# Patient Record
Sex: FEMALE | Race: WHITE | NOT HISPANIC OR LATINO | URBAN - METROPOLITAN AREA
[De-identification: names, ages, dates, MRNs, and addresses within clinical notes are randomized per-mention and may not be internally consistent; named-entity substitution may affect disease eponyms.]

---

## 2018-07-01 ENCOUNTER — EMERGENCY (EMERGENCY)
Facility: HOSPITAL | Age: 43
LOS: 1 days | End: 2018-07-01
Payer: COMMERCIAL

## 2018-07-01 PROCEDURE — 99283 EMERGENCY DEPT VISIT LOW MDM: CPT | Mod: 25

## 2022-03-15 ENCOUNTER — EMERGENCY (EMERGENCY)
Facility: HOSPITAL | Age: 47
LOS: 1 days | Discharge: HOME | End: 2022-03-15
Admitting: EMERGENCY MEDICINE
Payer: COMMERCIAL

## 2022-03-15 PROCEDURE — 99284 EMERGENCY DEPT VISIT MOD MDM: CPT
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
## 2022-03-17 DIAGNOSIS — S33.5XXA SPRAIN OF LIGAMENTS OF LUMBAR SPINE, INITIAL ENCOUNTER: ICD-10-CM
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
## 2022-03-17 DIAGNOSIS — M54.50 LOW BACK PAIN, UNSPECIFIED: ICD-10-CM
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
## 2022-03-17 DIAGNOSIS — S39.012A STRAIN OF MUSCLE, FASCIA AND TENDON OF LOWER BACK, INITIAL ENCOUNTER: ICD-10-CM
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
## 2022-03-18 ENCOUNTER — APPOINTMENT (OUTPATIENT)
Dept: NEUROSURGERY | Facility: CLINIC | Age: 47
End: 2022-03-18
Payer: COMMERCIAL

## 2022-03-18 VITALS
HEIGHT: 60 IN | WEIGHT: 115 LBS | HEART RATE: 86 BPM | TEMPERATURE: 97.7 F | BODY MASS INDEX: 22.58 KG/M2 | DIASTOLIC BLOOD PRESSURE: 86 MMHG | SYSTOLIC BLOOD PRESSURE: 129 MMHG

## 2022-03-18 PROBLEM — Z00.00 ENCOUNTER FOR PREVENTIVE HEALTH EXAMINATION: Status: ACTIVE | Noted: 2022-03-18

## 2022-03-18 PROCEDURE — 99203 OFFICE O/P NEW LOW 30 MIN: CPT

## 2022-03-18 RX ORDER — FLUOXETINE HCL 10 MG
TABLET ORAL
Refills: 0 | Status: ACTIVE | COMMUNITY

## 2022-03-18 NOTE — REASON FOR VISIT
[New Patient Visit] : a new patient visit [Referred By: _________] : Patient was referred by ISELA [FreeTextEntry1] : BACK PAIN- [facility] ER FOLLOW UP

## 2022-03-18 NOTE — HISTORY OF PRESENT ILLNESS
[FreeTextEntry1] : low back pain [de-identified] : 47 year old female with 1 week\par Low back pain.  She states that she felt that injury when she was carrying objects.  She denies any leg pain denies any numbness or weakness of bladder bowel function.  States pain is moderate.  Is somewhat improved since her recent visit at [facility].  The pain is constant.  She denies any relieving or aggravating factors denies any bladder incontinence.  She has been taking several medications provided by hospital.  She has not had physical therapy pain management chiropractic care

## 2022-03-18 NOTE — ASSESSMENT
[FreeTextEntry1] : 47-year-old female with acute low back pain without radicular symptoms.  At this time will initiate conservative management which includes anti-inflammatories.  I will prescribe her Celebrex.  I will have her start a course of physical therapy.  She is unimproved in the next 4 to 6 weeks we will obtain an MRI of the lumbar spine.

## 2022-03-21 RX ORDER — CELECOXIB 200 MG/1
200 CAPSULE ORAL DAILY
Qty: 30 | Refills: 2 | Status: ACTIVE | COMMUNITY
Start: 2022-03-18 | End: 1900-01-01

## 2022-03-24 RX ORDER — MELOXICAM 15 MG/1
15 TABLET ORAL
Qty: 30 | Refills: 3 | Status: ACTIVE | COMMUNITY
Start: 2022-03-24 | End: 1900-01-01

## 2024-03-25 ENCOUNTER — OFFICE (OUTPATIENT)
Dept: URBAN - METROPOLITAN AREA CLINIC 38 | Facility: CLINIC | Age: 49
Setting detail: OPHTHALMOLOGY
End: 2024-03-25
Payer: COMMERCIAL

## 2024-03-25 VITALS — HEIGHT: 55 IN

## 2024-03-25 DIAGNOSIS — H25.13: ICD-10-CM

## 2024-03-25 DIAGNOSIS — H18.821: ICD-10-CM

## 2024-03-25 DIAGNOSIS — H16.223: ICD-10-CM

## 2024-03-25 DIAGNOSIS — H17.9: ICD-10-CM

## 2024-03-25 PROCEDURE — 92014 COMPRE OPH EXAM EST PT 1/>: CPT

## 2024-03-27 ASSESSMENT — REFRACTION_CURRENTRX
OS_SPHERE: --4.25
OD_VPRISM_DIRECTION: SV
OS_AXIS: 076
OD_CYLINDER: -0.25
OD_AXIS: 109
OS_OVR_VA: 20/
OS_VPRISM_DIRECTION: SV
OS_CYLINDER: -0.50
OD_OVR_VA: 20/
OD_SPHERE: -4.00

## 2024-03-27 ASSESSMENT — REFRACTION_MANIFEST
OU_VA: 20/20
OD_AXIS: 090
OS_VA1: 20/25-1
OS_AXIS: 080
OD_SPHERE: -4.00
OD_VA1: 20/20
OS_CYLINDER: -1.00
OD_CYLINDER: -0.25
OS_SPHERE: -4.75

## 2024-03-27 ASSESSMENT — SPHEQUIV_DERIVED
OS_SPHEQUIV: -5.25
OD_SPHEQUIV: -4.125

## 2024-05-07 ENCOUNTER — APPOINTMENT (OUTPATIENT)
Dept: NEUROSURGERY | Facility: CLINIC | Age: 49
End: 2024-05-07
Payer: COMMERCIAL

## 2024-05-07 DIAGNOSIS — M54.50 LOW BACK PAIN, UNSPECIFIED: ICD-10-CM

## 2024-05-07 DIAGNOSIS — M54.2 CERVICALGIA: ICD-10-CM

## 2024-05-07 PROCEDURE — 99213 OFFICE O/P EST LOW 20 MIN: CPT

## 2024-05-07 NOTE — REASON FOR VISIT
[Follow-Up: _____] : a [unfilled] follow-up visit [FreeTextEntry1] : Pt 50 y/o F presents in office for a RPA,no other complaints

## 2024-05-07 NOTE — ASSESSMENT
[FreeTextEntry1] : This is a 49-year-old female that presents today for neck pain and low back pain she started after caring for her dog.  She had some shakiness in her arms but denies any true radicular symptoms denies any numbness tingling or bladder bowel dysfunction.  She has not any dedicated physical therapy primary care doctor gave her muscle laxer and anti-inflammatories which made her sick pain is slightly improved over the past 2 weeks    Patient is grossly intact upper and lower extremities  X-rays of the cervical and lumbar spine limited to spine and patient only show normal curvatures of the cervical lumbar spine with degenerative changes and arthritic changes seen throughout without obvious new acute injuries or abnormalities images reviewed with neurosurgical attending  At this time I recommend only conservative management for the cervical and lumbar spine which includes physical therapy.  Avoid medication use as she reacted poorly to it.  Follow-up in 6 weeks if not improved could consider further imaging

## 2024-06-18 ENCOUNTER — APPOINTMENT (OUTPATIENT)
Dept: NEUROSURGERY | Facility: CLINIC | Age: 49
End: 2024-06-18

## 2025-01-22 ENCOUNTER — OFFICE (OUTPATIENT)
Dept: URBAN - METROPOLITAN AREA CLINIC 38 | Facility: CLINIC | Age: 50
Setting detail: OPHTHALMOLOGY
End: 2025-01-22
Payer: COMMERCIAL

## 2025-01-22 DIAGNOSIS — H16.221: ICD-10-CM

## 2025-01-22 DIAGNOSIS — H16.251: ICD-10-CM

## 2025-01-22 PROBLEM — H10.89 BACTERIAL CONJUNCTIVITIS ; RIGHT EYE: Status: ACTIVE | Noted: 2025-01-22

## 2025-01-22 PROCEDURE — 99213 OFFICE O/P EST LOW 20 MIN: CPT | Performed by: OPHTHALMOLOGY

## 2025-01-22 ASSESSMENT — REFRACTION_MANIFEST
OD_VA1: 20/20
OD_CYLINDER: -0.25
OS_CYLINDER: -1.00
OS_AXIS: 080
OD_SPHERE: -4.00
OD_AXIS: 090
OU_VA: 20/20
OS_VA1: 20/25-1
OS_SPHERE: -4.75

## 2025-01-22 ASSESSMENT — REFRACTION_CURRENTRX
OS_SPHERE: --4.25
OS_OVR_VA: 20/
OD_AXIS: 109
OS_VPRISM_DIRECTION: SV
OD_VPRISM_DIRECTION: SV
OD_OVR_VA: 20/
OS_AXIS: 076
OD_SPHERE: -4.00
OS_CYLINDER: -0.50
OD_CYLINDER: -0.25

## 2025-01-22 ASSESSMENT — TEAR BREAK UP TIME (TBUT)
OS_TBUT: 2 SEC
OD_TBUT: 2 SEC

## 2025-01-22 ASSESSMENT — CONFRONTATIONAL VISUAL FIELD TEST (CVF)
OS_FINDINGS: FULL
OD_FINDINGS: FULL

## 2025-01-22 ASSESSMENT — KERATOMETRY
OS_K1POWER_DIOPTERS: 44.25
OD_K1POWER_DIOPTERS: 44.00
OS_K2POWER_DIOPTERS: 44.50
OS_AXISANGLE_DEGREES: 168
OD_K2POWER_DIOPTERS: 44.00
OD_AXISANGLE_DEGREES: 090
METHOD_AUTO_MANUAL: AUTO

## 2025-01-22 ASSESSMENT — CORNEAL SURGICAL SCARRING: OD_SCARRING: STROMAL

## 2025-01-22 ASSESSMENT — REFRACTION_AUTOREFRACTION
OS_CYLINDER: -0.50
OS_SPHERE: -4.75
OD_SPHERE: -4.50
OS_AXIS: 076
OD_CYLINDER: -0.25
OD_AXIS: 088

## 2025-01-22 ASSESSMENT — TONOMETRY
OD_IOP_MMHG: 16
OS_IOP_MMHG: 20

## 2025-01-22 ASSESSMENT — VISUAL ACUITY
OS_BCVA: 20/25
OD_BCVA: 20/25

## 2025-01-22 ASSESSMENT — SUPERFICIAL PUNCTATE KERATITIS (SPK): OD_SPK: T

## 2025-05-27 ENCOUNTER — NON-APPOINTMENT (OUTPATIENT)
Age: 50
End: 2025-05-27